# Patient Record
Sex: MALE | Race: WHITE | NOT HISPANIC OR LATINO | Employment: UNEMPLOYED | URBAN - NONMETROPOLITAN AREA
[De-identification: names, ages, dates, MRNs, and addresses within clinical notes are randomized per-mention and may not be internally consistent; named-entity substitution may affect disease eponyms.]

---

## 2023-11-01 ENCOUNTER — CLINICAL SUPPORT (OUTPATIENT)
Dept: RESPIRATORY THERAPY | Facility: HOSPITAL | Age: 23
End: 2023-11-01
Attending: NURSE PRACTITIONER
Payer: MEDICAID

## 2023-11-01 DIAGNOSIS — R42 DIZZINESS AND GIDDINESS: Primary | ICD-10-CM

## 2023-11-01 DIAGNOSIS — R42 DIZZINESS AND GIDDINESS: ICD-10-CM

## 2023-11-01 PROCEDURE — 93010 ELECTROCARDIOGRAM REPORT: CPT | Mod: ,,, | Performed by: INTERNAL MEDICINE

## 2023-11-01 PROCEDURE — 93010 EKG 12-LEAD: ICD-10-PCS | Mod: ,,, | Performed by: INTERNAL MEDICINE

## 2023-11-01 PROCEDURE — 93005 ELECTROCARDIOGRAM TRACING: CPT

## 2023-11-03 ENCOUNTER — HOSPITAL ENCOUNTER (OUTPATIENT)
Dept: CARDIOLOGY | Facility: HOSPITAL | Age: 23
Discharge: HOME | End: 2023-11-03
Attending: NURSE PRACTITIONER
Payer: MEDICAID

## 2023-11-03 DIAGNOSIS — R94.31 NONSPECIFIC ABNORMAL ELECTROCARDIOGRAM (ECG) (EKG): Primary | ICD-10-CM

## 2023-11-03 DIAGNOSIS — R94.31 NONSPECIFIC ABNORMAL ELECTROCARDIOGRAM (ECG) (EKG): ICD-10-CM

## 2023-11-03 PROCEDURE — 93225 XTRNL ECG REC<48 HRS REC: CPT

## 2023-11-06 LAB
OHS CV EVENT MONITOR DAY: 0
OHS CV HOLTER LENGTH DECIMAL HOURS: 24.38
OHS CV HOLTER LENGTH HOURS: 24
OHS CV HOLTER LENGTH MINUTES: 23
OHS CV HOLTER SINUS AVERAGE HR: 71
OHS CV HOLTER SINUS MAX HR: 123
OHS CV HOLTER SINUS MIN HR: 46

## 2023-11-12 ENCOUNTER — HOSPITAL ENCOUNTER (EMERGENCY)
Facility: HOSPITAL | Age: 23
Discharge: HOME | End: 2023-11-12
Attending: FAMILY MEDICINE
Payer: MEDICAID

## 2023-11-12 VITALS
DIASTOLIC BLOOD PRESSURE: 88 MMHG | TEMPERATURE: 98 F | OXYGEN SATURATION: 99 % | HEART RATE: 73 BPM | HEIGHT: 71 IN | RESPIRATION RATE: 20 BRPM | SYSTOLIC BLOOD PRESSURE: 127 MMHG

## 2023-11-12 DIAGNOSIS — R07.9 CHEST PAIN: ICD-10-CM

## 2023-11-12 DIAGNOSIS — R55 NEAR SYNCOPE: Primary | ICD-10-CM

## 2023-11-12 LAB
ALBUMIN SERPL-MCNC: 4.5 G/DL (ref 3.4–5)
ALBUMIN/GLOB SERPL: 1.4 RATIO
ALP SERPL-CCNC: 67 UNIT/L (ref 50–144)
ALT SERPL-CCNC: 19 UNIT/L (ref 1–45)
ANION GAP SERPL CALC-SCNC: 12 MEQ/L (ref 2–13)
AST SERPL-CCNC: 23 UNIT/L (ref 17–59)
BASOPHILS # BLD AUTO: 0.05 X10(3)/MCL (ref 0.01–0.08)
BASOPHILS NFR BLD AUTO: 0.6 % (ref 0.1–1.2)
BILIRUB SERPL-MCNC: 1 MG/DL (ref 0–1)
BNP BLD-MCNC: <20 PG/ML (ref 0–124.9)
BUN SERPL-MCNC: 10 MG/DL (ref 7–20)
CALCIUM SERPL-MCNC: 9.5 MG/DL (ref 8.4–10.2)
CHLORIDE SERPL-SCNC: 101 MMOL/L (ref 98–110)
CO2 SERPL-SCNC: 29 MMOL/L (ref 21–32)
CREAT SERPL-MCNC: 0.82 MG/DL (ref 0.66–1.25)
CREAT/UREA NIT SERPL: 12 (ref 12–20)
D DIMER PPP IA.FEU-MCNC: 0.19 MG/L (ref 0.19–0.5)
EOSINOPHIL # BLD AUTO: 0.18 X10(3)/MCL (ref 0.04–0.54)
EOSINOPHIL NFR BLD AUTO: 2.1 % (ref 0.7–7)
ERYTHROCYTE [DISTWIDTH] IN BLOOD BY AUTOMATED COUNT: 12.7 %
GFR SERPLBLD CREATININE-BSD FMLA CKD-EPI: >90 MLS/MIN/1.73/M2
GLOBULIN SER-MCNC: 3.3 GM/DL (ref 2–3.9)
GLUCOSE SERPL-MCNC: 111 MG/DL (ref 70–115)
HCT VFR BLD AUTO: 41.9 % (ref 36–52)
HGB BLD-MCNC: 14.5 G/DL (ref 13–18)
IMM GRANULOCYTES # BLD AUTO: 0.02 X10(3)/MCL (ref 0–0.03)
IMM GRANULOCYTES NFR BLD AUTO: 0.2 % (ref 0–0.5)
INFLUENZA A (OHS): NEGATIVE
INFLUENZA B (OHS): NEGATIVE
LYMPHOCYTES # BLD AUTO: 1.64 X10(3)/MCL (ref 1.32–3.57)
LYMPHOCYTES NFR BLD AUTO: 19.6 % (ref 20–55)
MCH RBC QN AUTO: 30 PG (ref 27–34)
MCHC RBC AUTO-ENTMCNC: 34.6 G/DL (ref 31–37)
MCV RBC AUTO: 86.6 FL (ref 79–99)
MONOCYTES # BLD AUTO: 0.62 X10(3)/MCL (ref 0.3–0.82)
MONOCYTES NFR BLD AUTO: 7.4 % (ref 4.7–12.5)
NEUTROPHILS # BLD AUTO: 5.87 X10(3)/MCL (ref 1.78–5.38)
NEUTROPHILS NFR BLD AUTO: 70.1 % (ref 37–73)
NRBC BLD AUTO-RTO: 0 %
PLATELET # BLD AUTO: 214 X10(3)/MCL (ref 140–371)
PMV BLD AUTO: 9.5 FL (ref 9.4–12.4)
POTASSIUM SERPL-SCNC: 4.2 MMOL/L (ref 3.5–5.1)
PROT SERPL-MCNC: 7.8 GM/DL (ref 6.3–8.2)
RBC # BLD AUTO: 4.84 X10(6)/MCL (ref 4–6)
SARS-COV-2 RDRP RESP QL NAA+PROBE: NEGATIVE
SODIUM SERPL-SCNC: 142 MMOL/L (ref 135–145)
TROPONIN I SERPL-MCNC: <0.012 NG/ML (ref 0–0.03)
WBC # SPEC AUTO: 8.38 X10(3)/MCL (ref 4–11.5)

## 2023-11-12 PROCEDURE — 96361 HYDRATE IV INFUSION ADD-ON: CPT

## 2023-11-12 PROCEDURE — 93010 ELECTROCARDIOGRAM REPORT: CPT | Mod: ,,, | Performed by: INTERNAL MEDICINE

## 2023-11-12 PROCEDURE — 85379 FIBRIN DEGRADATION QUANT: CPT | Performed by: FAMILY MEDICINE

## 2023-11-12 PROCEDURE — 93005 ELECTROCARDIOGRAM TRACING: CPT

## 2023-11-12 PROCEDURE — 83880 ASSAY OF NATRIURETIC PEPTIDE: CPT | Performed by: FAMILY MEDICINE

## 2023-11-12 PROCEDURE — 85025 COMPLETE CBC W/AUTO DIFF WBC: CPT | Performed by: FAMILY MEDICINE

## 2023-11-12 PROCEDURE — 93010 EKG 12-LEAD: ICD-10-PCS | Mod: ,,, | Performed by: INTERNAL MEDICINE

## 2023-11-12 PROCEDURE — 87400 INFLUENZA A/B EACH AG IA: CPT | Performed by: FAMILY MEDICINE

## 2023-11-12 PROCEDURE — 87635 SARS-COV-2 COVID-19 AMP PRB: CPT | Performed by: FAMILY MEDICINE

## 2023-11-12 PROCEDURE — 96360 HYDRATION IV INFUSION INIT: CPT

## 2023-11-12 PROCEDURE — 80053 COMPREHEN METABOLIC PANEL: CPT | Performed by: FAMILY MEDICINE

## 2023-11-12 PROCEDURE — 25000003 PHARM REV CODE 250: Performed by: FAMILY MEDICINE

## 2023-11-12 PROCEDURE — 84484 ASSAY OF TROPONIN QUANT: CPT | Performed by: FAMILY MEDICINE

## 2023-11-12 PROCEDURE — 99285 EMERGENCY DEPT VISIT HI MDM: CPT | Mod: 25

## 2023-11-12 RX ADMIN — SODIUM CHLORIDE 500 ML: 9 INJECTION, SOLUTION INTRAVENOUS at 09:11

## 2023-11-12 NOTE — Clinical Note
"Edgar Lance ([place]) was seen and treated in our emergency department on 11/12/2023.  He may return to work on 11/14/2023.       If you have any questions or concerns, please don't hesitate to call.       RN    "

## 2023-11-13 NOTE — ED PROVIDER NOTES
Encounter Date: 11/12/2023       History     Chief Complaint   Patient presents with    Dizziness    Chest Pain     Pt reports waking up approx 1800 and being dizzy and having intermittent right side chest pain. Pt describes chest pain as stabbing but non radiating. Pt reports cardiac workup and wearing holter monitor 24 hrs, last week at [place] for same chest pain. Report hx of WPW. Denies LOC or syncopal episode.     Patient presents for evaluation of near syncope. Patient notes having an episode of near syncope today just PTA. Patient notes episode last several minutes. Patient denies having any chest pain, palpitations, sob or any other symptoms. Near syncope occurred while standing. Patient notes still feeling not himself at present.     The history is provided by the patient.     Review of patient's allergies indicates:  No Known Allergies  Past Medical History:   Diagnosis Date    ADHD (attention deficit hyperactivity disorder)     Bilateral posterior subcapsular polar cataract     OCD (obsessive compulsive disorder)     Strabismus     Suicidal ideation     WPW (Wendy-Parkinson-White syndrome)      Past Surgical History:   Procedure Laterality Date    EYE SURGERY      strabismus repair     Family History   Problem Relation Age of Onset    Anesthesia problems Neg Hx     Early death Neg Hx     Heart attacks under age 50 Maternal Uncle     Congenital heart disease Paternal Uncle      Social History     Tobacco Use    Smoking status: Former     Current packs/day: 0.50     Types: Cigarettes    Tobacco comments:     quit smoking a few monthes ago per mother   Substance Use Topics    Alcohol use: No    Drug use: No     Review of Systems   Constitutional: Negative.    HENT: Negative.     Eyes: Negative.    Respiratory: Negative.     Cardiovascular:         Near Syncope   Gastrointestinal: Negative.    Endocrine: Negative.    Musculoskeletal: Negative.    Skin: Negative.    Allergic/Immunologic: Negative.     Neurological:  Positive for dizziness and weakness.   Psychiatric/Behavioral: Negative.     All other systems reviewed and are negative.      Physical Exam     Initial Vitals [11/12/23 2031]   BP Pulse Resp Temp SpO2   127/88 73 20 98.1 °F (36.7 °C) 99 %      MAP       --         Physical Exam    Vitals reviewed.  Constitutional: He appears well-developed and well-nourished.   HENT:   Head: Normocephalic.   Eyes: EOM are normal. Pupils are equal, round, and reactive to light.   Neck: Neck supple.   Normal range of motion.  Cardiovascular:  Normal rate, regular rhythm and normal heart sounds.           Pulmonary/Chest: Breath sounds normal.   Abdominal: Abdomen is soft. Bowel sounds are normal.   Musculoskeletal:         General: Normal range of motion.      Cervical back: Normal range of motion and neck supple.     Neurological: He is alert and oriented to person, place, and time. He has normal reflexes.   Skin: Skin is warm.   Psychiatric: He has a normal mood and affect.         ED Course   Procedures  Labs Reviewed   CBC WITH DIFFERENTIAL - Abnormal; Notable for the following components:       Result Value    Lymph % 19.6 (*)     Neut # 5.87 (*)     All other components within normal limits   RAPID INFLUENZA A/B - Normal   TROPONIN I - Normal   NT-PRO NATRIURETIC PEPTIDE - Normal   D DIMER, QUANTITATIVE - Normal   SARS-COV-2 RNA AMPLIFICATION, QUAL - Normal   CBC W/ AUTO DIFFERENTIAL    Narrative:     The following orders were created for panel order CBC auto differential.  Procedure                               Abnormality         Status                     ---------                               -----------         ------                     CBC with Differential[[id number]]       Abnormal            Final result                 Please view results for these tests on the individual orders.   COMPREHENSIVE METABOLIC PANEL     EKG Readings: (Independently Interpreted)   Initial Reading: No STEMI. Previous EKG:  Compared with most recent EKG Ectopy: No Ectopy. ST Segments: Normal ST Segments. T Waves: Normal.   NSR at 66 BPM       Imaging Results              CT Head Without Contrast (Preliminary result)  Result time 11/12/23 22:34:34      Preliminary result by Masood Zhou MD (11/12/23 22:34:34)                   Narrative:    START OF REPORT:  Technique: CT of the head was performed without intravenous contrast with axial as well as coronal and sagittal images.    Comparison: None.    Dosage Information: Automated exposure control was utilized.    Clinical history: DIZZY DUFFY SOB.    Findings:  Hemorrhage: No acute intracranial hemorrhage is seen.  CSF spaces: The ventricles sulci and basal cisterns are within normal limits.  Brain parenchyma: There is preservation of the grey white junction throughout. No acute infarct is identified.  Cerebellum: Unremarkable.  Vascular: Unremarkable venous sinuses.  Sella and skull base: The sella appears to be within normal limits for age.  Cerebellopontine angles: Within normal limits.  Herniation: None.  Intracranial calcifications: Incidental note is made of bilateral choroid plexus calcification. Incidental note is made of some pineal region calcification.  Calvarium: No acute linear or depressed skull fracture is seen.    Maxillofacial Structures:  Paranasal sinuses: The visualized paranasal sinuses appear clear with no mucoperiosteal thickening or air fluid levels identified.  Orbits: The orbits appear unremarkable.  Zygomatic arches: The zygomatic arches are intact and unremarkable.  Temporal bones and mastoids: The temporal bones and mastoids appear unremarkable.  TMJ: The mandibular condyles appear normally placed with respect to the mandibular fossa.  Nasal Bones: No displaced nasal bone fracture is seen.      Impression:  1. No acute intracranial process identified. Details and other findings as noted above.                                         X-Ray Chest PA And  Lateral (In process)                      Medications   sodium chloride 0.9% bolus 500 mL 500 mL (0 mLs Intravenous Stopped 11/12/23 2200)   sodium chloride 0.9% bolus 500 mL 500 mL (0 mLs Intravenous Stopped 11/12/23 2217)     Medical Decision Making  Amount and/or Complexity of Data Reviewed  Labs: ordered.  Radiology: ordered.                               Clinical Impression:   Final diagnoses:  [R07.9] Chest pain  [R55] Near syncope (Primary)               Ariel Lantigua MD  11/12/23 4172

## 2024-04-15 ENCOUNTER — HOSPITAL ENCOUNTER (EMERGENCY)
Facility: HOSPITAL | Age: 24
Discharge: HOME | End: 2024-04-15
Payer: COMMERCIAL

## 2024-04-15 VITALS
SYSTOLIC BLOOD PRESSURE: 134 MMHG | RESPIRATION RATE: 17 BRPM | HEIGHT: 71 IN | HEART RATE: 63 BPM | TEMPERATURE: 98 F | OXYGEN SATURATION: 100 % | DIASTOLIC BLOOD PRESSURE: 87 MMHG

## 2024-04-15 DIAGNOSIS — H53.9 VISUAL DISTURBANCE: Primary | ICD-10-CM

## 2024-04-15 DIAGNOSIS — H20.9 UVEITIS OF RIGHT EYE: ICD-10-CM

## 2024-04-15 PROCEDURE — 99284 EMERGENCY DEPT VISIT MOD MDM: CPT | Mod: 25

## 2024-04-15 PROCEDURE — 25000003 PHARM REV CODE 250: Performed by: NURSE PRACTITIONER

## 2024-04-15 RX ORDER — PROPARACAINE HYDROCHLORIDE 5 MG/ML
1 SOLUTION/ DROPS OPHTHALMIC
Status: COMPLETED | OUTPATIENT
Start: 2024-04-15 | End: 2024-04-15

## 2024-04-15 RX ORDER — NEOMYCIN SULFATE, POLYMYXIN B SULFATE AND DEXAMETHASONE 3.5; 10000; 1 MG/ML; [USP'U]/ML; MG/ML
2 SUSPENSION/ DROPS OPHTHALMIC EVERY 4 HOURS
Qty: 5 ML | Refills: 0 | Status: SHIPPED | OUTPATIENT
Start: 2024-04-15 | End: 2024-04-20

## 2024-04-15 RX ORDER — HYDROCODONE BITARTRATE AND ACETAMINOPHEN 5; 325 MG/1; MG/1
1 TABLET ORAL
Status: COMPLETED | OUTPATIENT
Start: 2024-04-15 | End: 2024-04-15

## 2024-04-15 RX ADMIN — PROPARACAINE HYDROCHLORIDE 1 DROP: 5 SOLUTION/ DROPS OPHTHALMIC at 09:04

## 2024-04-15 RX ADMIN — HYDROCODONE BITARTRATE AND ACETAMINOPHEN 1 TABLET: 5; 325 TABLET ORAL at 08:04

## 2024-04-15 NOTE — ED PROVIDER NOTES
Encounter Date: 4/15/2024       History     Chief Complaint   Patient presents with    Eye Pain     Reports waking up this AM and right eye was swollen and painful. Denies getting anything in his eye or any injury. Redness and swelling noted to right eye.      C/O rt eye redness, pain and swelling onset this morning, denies injury      Review of patient's allergies indicates:  No Known Allergies  Past Medical History:   Diagnosis Date    ADHD (attention deficit hyperactivity disorder)     Bilateral posterior subcapsular polar cataract     OCD (obsessive compulsive disorder)     Seizures     Strabismus     Suicidal ideation     WPW (Wendy-Parkinson-White syndrome)      Past Surgical History:   Procedure Laterality Date    EYE SURGERY      strabismus repair     Family History   Problem Relation Name Age of Onset    Anesthesia problems Neg Hx      Early death Neg Hx      Heart attacks under age 50 Maternal Uncle      Congenital heart disease Paternal Uncle       Social History     Tobacco Use    Smoking status: Every Day     Types: Vaping with nicotine, Cigarettes    Tobacco comments:     quit smoking a few monthes ago per mother   Substance Use Topics    Alcohol use: No    Drug use: No     Review of Systems   Eyes:  Positive for pain, discharge and redness.   All other systems reviewed and are negative.      Physical Exam     Initial Vitals [04/15/24 1810]   BP Pulse Resp Temp SpO2   136/83 73 18 98.1 °F (36.7 °C) 100 %      MAP       --         Physical Exam    Nursing note and vitals reviewed.  Constitutional: He appears well-developed and well-nourished.   HENT:   Head: Normocephalic and atraumatic.   Mouth/Throat: Mucous membranes are normal.   Eyes: EOM are normal. Pupils are equal, round, and reactive to light.   Rt eye erythema, mild upper eyelid swelling, clear drainage   Neck: Neck supple.   Normal range of motion.  Cardiovascular:  Normal rate, regular rhythm and normal heart sounds.            Pulmonary/Chest: Breath sounds normal.   Abdominal: Abdomen is soft. Bowel sounds are normal.   Musculoskeletal:         General: Normal range of motion.      Cervical back: Normal range of motion and neck supple.     Neurological: He is alert and oriented to person, place, and time. He has normal strength.   Skin: Skin is warm and dry. Capillary refill takes less than 2 seconds.   Psychiatric: He has a normal mood and affect. His behavior is normal. Judgment and thought content normal.         ED Course   Procedures  Labs Reviewed - No data to display       Imaging Results              CT Head Without Contrast (Final result)  Result time 04/16/24 09:38:15      Final result by David Madrigal MD (04/16/24 09:38:15)                   Impression:      1. Nighthawk concordant.  No acute intracranial abnormality.  2. Dysconjugate gaze.      Electronically signed by: David Madrigal MD  Date:    04/16/2024  Time:    09:38               Narrative:    EXAMINATION:  CT HEAD WITHOUT CONTRAST    CLINICAL HISTORY:  Disconjugate gaze; Eye Pain (Reports waking up this AM and right eye was swollen and painful. Denies getting anything in his eye or any injury. Redness and swelling noted to right eye. ) disconjugate gaze.    TECHNIQUE:  Low dose axial images were obtained through the head.  Coronal and sagittal reformations were also performed. Contrast was not administered.  Dose reduction techniques including automatic exposure control (AEC) were utilized.    Dose (DLP): 1175.2 mGycm    COMPARISON:  CT head without contrast, 11/12/2023.    FINDINGS:  INTRACRANIAL: Brain parenchyma demonstrates normal attenuation and configuration.  No acute intracranial hemorrhage.  No hydrocephalus.  No intracranial mass effect.    SINUSES: Visualized paranasal sinuses and mastoids are clear.    SKULL/SCALP: Visualized osseous structures are normal.    ORBITS: Dysconjugate gaze is noted.                        Preliminary result by  Masood Zhou MD (04/15/24 21:13:27)                   Impression:    1. No acute intracranial traumatic injury identified. Details as above.               Narrative:    START OF REPORT:  Technique: CT of the head was performed without intravenous contrast with axial as well as coronal and sagittal images.    Comparison: Comparison is with study dated 2023-11-12 22:07:59.    Dosage Information: Automated exposure control was utilized.    Clinical history: Eye Pain (Reports waking up this AM and right eye was swollen and painful. Denies getting anything in his eye or any injury. Redness and swelling noted to right eye. ) disconjugate gaze.    Findings:  Hemorrhage: No acute intracranial hemorrhage is seen.  CSF spaces: The ventricles sulci and basal cisterns are within normal limits.  Brain parenchyma: There is preservation of the grey white junction throughout. No acute infarct.  Cerebellum: Unremarkable.  Vascular: Unremarkable venous sinuses.  Sella and skull base: The sella appears to be within normal limits for age.  Cerebellopontine angles: Within normal limits.  Herniation: None.  Intracranial calcifications: Incidental note is made of bilateral choroid plexus calcification. Incidental note is made of some pineal region calcification.  Calvarium: No acute linear or depressed skull fracture is seen.    Maxillofacial Structures:  Paranasal sinuses: The visualized paranasal sinuses appear clear with no significant mucoperiosteal thickening or air fluid levels identified.  Orbits: No significant soft tissue swelling appreciated. The orbits appear unremarkable.  Zygomatic arches: The zygomatic arches are intact and unremarkable.  Temporal bones and mastoids: The temporal bones and mastoids appear unremarkable.  TMJ: The mandibular condyles appear normally placed with respect to the mandibular fossa.                                         CT Orbits Sella Post Fossa Without Cont (Final result)  Result time  04/15/24 19:32:04      Final result by Ariel Sneed MD (04/15/24 19:32:04)                   Impression:      1. Dysconjugate gaze.      Electronically signed by: Ariel Sneed MD  Date:    04/15/2024  Time:    19:32               Narrative:    EXAMINATION:  CT ORBITS SELLA POST FOSSA WITHOUT CONT    CLINICAL HISTORY:  Ocular pain;    TECHNIQUE:  Axial, helical imaging of the orbits was performed.  Axial, sagittal and coronal images were generated.  Soft tissue and bone algorithms were applied.  Automated exposure control was utilized to limit radiation exposure to the patient.   Total DLP for this study was 414 mgycm.    COMPARISON:  No prior pertinent study is currently available.    FINDINGS:  There is a dysconjugate gaze.  Globes, extraocular muscles and optic nerves are otherwise unremarkable.  There is no retro bulb are mass or fluid collection.  There is no acute intracranial finding.  No fracture.                                       Medications   HYDROcodone-acetaminophen 5-325 mg per tablet 1 tablet (1 tablet Oral Given 4/15/24 2001)   proparacaine 0.5 % ophthalmic solution 1 drop (1 drop Right Eye Given 4/15/24 2119)     Medical Decision Making  CC- rt eye red, pain, swelling  DD- uveitis, conjunctivitis  Test- none  Tx- none  Dx- Uveitis Of Right Eye  Visual Disturbance    DC home, stable Polymyxin B Rx    Bette Yo LPN says she does not have any questions or concerns about this patient discharge        Problems Addressed:  Uveitis of right eye: acute illness or injury  Visual disturbance: acute illness or injury    Amount and/or Complexity of Data Reviewed  Radiology: ordered.    Risk  Prescription drug management.                                      Clinical Impression:  Final diagnoses:  [H53.9] Visual disturbance (Primary)  [H20.9] Uveitis of right eye          ED Disposition Condition    Discharge Stable          ED Prescriptions       Medication Sig Dispense Start Date End Date  Auth. Provider    neomycin-polymyxin-dexamethasone (MAXITROL) 3.5mg/mL-10,000 unit/mL-0.1 % DrpS Place 2 drops into the right eye every 4 (four) hours. for 5 days 5 mL 4/15/2024 4/20/2024 Bisi Cosby FNP          Follow-up Information       Follow up With Specialties Details Why Contact Info    Ernesto Rosas, OD Optometry Call in 1 day  [address] [facility] [id number] [phone]               Bisi Cosby FNP  04/18/24 8370

## 2024-04-16 NOTE — FIRST PROVIDER EVALUATION
"Medical screening examination initiated.  I have conducted a focused provider triage encounter, findings are as follows:    Brief history of present illness:  on chart    Vitals:    04/15/24 1810 04/15/24 1910 04/15/24 2001 04/15/24 2010   BP: 136/83 135/85  128/86   BP Location: Right arm Left arm  Left arm   Patient Position: Sitting Sitting  Sitting   Pulse: 73 75  73   Resp: 18 17 17 18   Temp: 98.1 °F (36.7 °C)      TempSrc: Oral      SpO2: 100%      Height: 5' 11" (1.803 m)          Pertinent physical exam:  on chart    Brief workup plan:  on chart    Preliminary workup initiated; this workup will be continued and followed by the physician or advanced practice provider that is assigned to the patient when roomed.  "

## 2024-04-16 NOTE — PROVIDER PROGRESS NOTES - EMERGENCY DEPT.
Encounter Date: 4/15/2024     1950  I reviewed the CT results with Dr Luciano and he agrees with CT Brain w/o to rule out a lesion or other morphology  ED Physician Progress Notes